# Patient Record
Sex: MALE | Race: WHITE | NOT HISPANIC OR LATINO | Employment: FULL TIME | URBAN - METROPOLITAN AREA
[De-identification: names, ages, dates, MRNs, and addresses within clinical notes are randomized per-mention and may not be internally consistent; named-entity substitution may affect disease eponyms.]

---

## 2018-04-10 ENCOUNTER — TELEPHONE (OUTPATIENT)
Dept: FAMILY MEDICINE | Facility: CLINIC | Age: 23
End: 2018-04-10

## 2018-04-10 RX ORDER — ERGOCALCIFEROL 1.25 MG/1
50000 CAPSULE ORAL
Qty: 12 CAPSULE | Refills: 1 | Status: SHIPPED | OUTPATIENT
Start: 2018-04-10

## 2018-04-10 NOTE — TELEPHONE ENCOUNTER
----- Message from Renana Carrington sent at 4/10/2018  9:17 AM CDT -----  Type:  Test Results    Who Called:  Patient   Name of Test (Lab/Mammo/Etc):  Labs Xray, EKG, Echo  Date of Test:  Pt does not remember date  Ordering Provider:  Ranjeet Nieves  Where the test was performed:  [facility] Call Back Number:  [phone]  Additional Information:
